# Patient Record
Sex: MALE | Race: WHITE | Employment: OTHER | URBAN - METROPOLITAN AREA
[De-identification: names, ages, dates, MRNs, and addresses within clinical notes are randomized per-mention and may not be internally consistent; named-entity substitution may affect disease eponyms.]

---

## 2021-06-07 ENCOUNTER — HOSPITAL ENCOUNTER (EMERGENCY)
Facility: CLINIC | Age: 76
Discharge: HOME | End: 2021-06-07
Attending: EMERGENCY MEDICINE
Payer: MEDICARE

## 2021-06-07 VITALS
OXYGEN SATURATION: 95 % | RESPIRATION RATE: 16 BRPM | HEART RATE: 92 BPM | WEIGHT: 202 LBS | TEMPERATURE: 99.9 F | DIASTOLIC BLOOD PRESSURE: 83 MMHG | SYSTOLIC BLOOD PRESSURE: 136 MMHG | HEIGHT: 72 IN | BODY MASS INDEX: 27.36 KG/M2

## 2021-06-07 DIAGNOSIS — R25.1 MUSCLE TREMOR: Primary | ICD-10-CM

## 2021-06-07 LAB
ABSOLUTE EOS #: 0.9 K/UL (ref 0–0.4)
ABSOLUTE IMMATURE GRANULOCYTE: ABNORMAL K/UL (ref 0–0.3)
ABSOLUTE LYMPH #: 1.4 K/UL (ref 1–4.8)
ABSOLUTE MONO #: 1 K/UL (ref 0.1–1.2)
ANION GAP SERPL CALCULATED.3IONS-SCNC: 13 MMOL/L (ref 9–17)
BASOPHILS # BLD: 1 % (ref 0–2)
BASOPHILS ABSOLUTE: 0.1 K/UL (ref 0–0.2)
BUN BLDV-MCNC: 21 MG/DL (ref 8–23)
BUN/CREAT BLD: ABNORMAL (ref 9–20)
CALCIUM SERPL-MCNC: 9 MG/DL (ref 8.6–10.4)
CHLORIDE BLD-SCNC: 105 MMOL/L (ref 98–107)
CO2: 23 MMOL/L (ref 20–31)
CREAT SERPL-MCNC: 0.8 MG/DL (ref 0.7–1.2)
DIFFERENTIAL TYPE: ABNORMAL
EOSINOPHILS RELATIVE PERCENT: 7 % (ref 1–4)
GFR AFRICAN AMERICAN: >60 ML/MIN
GFR NON-AFRICAN AMERICAN: >60 ML/MIN
GFR SERPL CREATININE-BSD FRML MDRD: ABNORMAL ML/MIN/{1.73_M2}
GFR SERPL CREATININE-BSD FRML MDRD: ABNORMAL ML/MIN/{1.73_M2}
GLUCOSE BLD-MCNC: 126 MG/DL (ref 70–99)
HCT VFR BLD CALC: 40.3 % (ref 41–53)
HEMOGLOBIN: 13.3 G/DL (ref 13.5–17.5)
IMMATURE GRANULOCYTES: ABNORMAL %
LYMPHOCYTES # BLD: 12 % (ref 24–44)
MCH RBC QN AUTO: 26.9 PG (ref 26–34)
MCHC RBC AUTO-ENTMCNC: 33.1 G/DL (ref 31–37)
MCV RBC AUTO: 81.2 FL (ref 80–100)
MONOCYTES # BLD: 8 % (ref 2–11)
NRBC AUTOMATED: ABNORMAL PER 100 WBC
PDW BLD-RTO: 14.2 % (ref 12.5–15.4)
PLATELET # BLD: 196 K/UL (ref 140–450)
PLATELET ESTIMATE: ABNORMAL
PMV BLD AUTO: 7.7 FL (ref 6–12)
POTASSIUM SERPL-SCNC: 4.4 MMOL/L (ref 3.7–5.3)
RBC # BLD: 4.96 M/UL (ref 4.5–5.9)
RBC # BLD: ABNORMAL 10*6/UL
SEG NEUTROPHILS: 72 % (ref 36–66)
SEGMENTED NEUTROPHILS ABSOLUTE COUNT: 8.7 K/UL (ref 1.8–7.7)
SODIUM BLD-SCNC: 141 MMOL/L (ref 135–144)
WBC # BLD: 12 K/UL (ref 3.5–11)
WBC # BLD: ABNORMAL 10*3/UL

## 2021-06-07 PROCEDURE — 36415 COLL VENOUS BLD VENIPUNCTURE: CPT

## 2021-06-07 PROCEDURE — 85025 COMPLETE CBC W/AUTO DIFF WBC: CPT

## 2021-06-07 PROCEDURE — 99283 EMERGENCY DEPT VISIT LOW MDM: CPT

## 2021-06-07 PROCEDURE — 80048 BASIC METABOLIC PNL TOTAL CA: CPT

## 2021-06-07 PROCEDURE — 96374 THER/PROPH/DIAG INJ IV PUSH: CPT

## 2021-06-07 PROCEDURE — 6360000002 HC RX W HCPCS: Performed by: EMERGENCY MEDICINE

## 2021-06-07 RX ORDER — LORAZEPAM 2 MG/ML
1 INJECTION INTRAMUSCULAR ONCE
Status: COMPLETED | OUTPATIENT
Start: 2021-06-07 | End: 2021-06-07

## 2021-06-07 RX ORDER — CLOPIDOGREL BISULFATE 75 MG/1
75 TABLET ORAL DAILY
COMMUNITY

## 2021-06-07 RX ORDER — SITAGLIPTIN AND METFORMIN HYDROCHLORIDE 1000; 50 MG/1; MG/1
1 TABLET, FILM COATED ORAL 2 TIMES DAILY WITH MEALS
COMMUNITY

## 2021-06-07 RX ORDER — CITALOPRAM 20 MG/1
20 TABLET ORAL DAILY
COMMUNITY

## 2021-06-07 RX ORDER — ASPIRIN 81 MG/1
243 TABLET, CHEWABLE ORAL
COMMUNITY

## 2021-06-07 RX ORDER — LORAZEPAM 1 MG/1
1 TABLET ORAL ONCE
Status: DISCONTINUED | OUTPATIENT
Start: 2021-06-07 | End: 2021-06-07 | Stop reason: HOSPADM

## 2021-06-07 RX ADMIN — LORAZEPAM 1 MG: 2 INJECTION INTRAMUSCULAR; INTRAVENOUS at 01:19

## 2021-06-07 NOTE — ED NOTES
Pt presents to ED via EMS with c/o tremors, onset 1600 yesterday. Pt states tremors got increasingly worse through out the night. Pt states this has happened once prior due to a medication change. Pt alert and oriented x4. Pt able to ambulate from stretcher to ED cart. Pt placed on telemetry. Pt vitals stable. Family at bedside. Pt denies pain.       Renee Domingo RN  06/07/21 5757

## 2021-06-07 NOTE — ED PROVIDER NOTES
eMERGENCY dEPARTMENT eNCOUnter      Pt Name: Nancy Shi  MRN: [id number]  [place] 1945  Date of evaluation: 6/7/2021      CHIEF COMPLAINT       Chief Complaint   Patient presents with    [address] [facility]    Juan Carlos Carey is a 68 y.o. male who presents with muscle tremors. Patient states he was out and about 4:00 started noticing some twitching of his muscles has progressively gotten worse now it is going throughout his entire body he denies associated chest pain shortness breath fever chills nausea or vomiting he had a muscle relaxant at home from an old prescription he tried taking it did not help so he is here for evaluation states he had a prior history of this may be 10 years ago and thought it was related to medication according to his doctor        REVIEW OF SYSTEMS       Review of systems are all reviewed and negative except stated above in HPI    [address]    has a past medical history of Diabetes mellitus ([facility] [address] SURGICAL HISTORY      has a past surgical history that includes Cardiac surgery and back surgery. CURRENT MEDICATIONS       Discharge Medication List as of 6/7/2021  2:09 AM      CONTINUE these medications which have NOT CHANGED    Details   sitaGLIPtan-metFORMIN (JANUMET)  MG per tablet Take 1 tablet by mouth 2 times daily (with meals)Historical Med      aspirin 81 MG chewable tablet Take 243 mg by mouthHistorical Med      clopidogrel (PLAVIX) 75 MG tablet Take 75 mg by mouth dailyHistorical Med      metoprolol tartrate (LOPRESSOR) 25 MG tablet Take 25 mg by mouth 2 times dailyHistorical Med      citalopram (CELEXA) 20 MG tablet Take 20 mg by mouth dailyHistorical Med             ALLERGIES     has No Known Allergies. FAMILY HISTORY     has no family status information on file. family history is not on file. SOCIAL HISTORY      reports that he has never smoked.  He has never used smokeless tobacco. He reports that he does not drink alcohol and does not use drugs. PHYSICAL EXAM     INITIAL VITALS:  height is 6' (1.829 m) and weight is 91.6 kg (202 lb). His oral temperature is 99.9 °F (37.7 °C). His blood pressure is 136/83 and his pulse is 92. His respiration is 16 and oxygen saturation is 95%. General: Patient alert nontoxic-appearing male in no apparent distress  HEENT: Head is atraumatic conjunctiva are clear mouth shows moist mucous membranes  Neck: Supple  Respiratory: Lung sounds are clear bilateral  Cardiac: Heart is regular rate and rhythm  GI: Abdomen soft nontender  Neuro: Patient has no gross focal neurological deficits at bedside exam occasionally has twitching of some of his muscles.     DIFFERENTIAL DIAGNOSIS/ MDM:     Tremors muscle spasm electrolyte abnormality medication reaction    DIAGNOSTIC RESULTS     EKG: All EKG's are interpreted by the Emergency Department Physician who either signs or Co-signs this chart in the absence of a cardiologist.        RADIOLOGY:   I directly visualized the following  images and reviewed the radiologist interpretations:  No orders to display         LABS:  Labs Reviewed   CBC WITH AUTO DIFFERENTIAL - Abnormal; Notable for the following components:       Result Value    WBC 12.0 (*)     Hemoglobin 13.3 (*)     Hematocrit 40.3 (*)     Seg Neutrophils 72 (*)     Lymphocytes 12 (*)     Eosinophils % 7 (*)     Segs Absolute 8.70 (*)     Absolute Eos # 0.90 (*)     All other components within normal limits   BASIC METABOLIC PANEL - Abnormal; Notable for the following components:    Glucose 126 (*)     All other components within normal limits         EMERGENCY DEPARTMENT COURSE:   Vitals:    Vitals:    06/07/21 0041 06/07/21 0043 06/07/21 0153   BP:  (!) 157/118 136/83   Pulse:  100 92   Resp:  19 16   Temp: 99.9 °F (37.7 °C)     TempSrc: Oral     SpO2:  93% 95%   Weight:  91.6 kg (202 lb)    Height:  6' (1.829 m)      -------------------------  BP: 136/83, Temp: 99.9 °F (37.7 °C), Pulse: 92, Resp: 16    Orders Placed This Encounter   Medications    DISCONTD: LORazepam (ATIVAN) tablet 1 mg    LORazepam (ATIVAN) injection 1 mg           Re-evaluation Notes    Laboratory results show an elevated white count of 49,235 uncertain etiology does have a low-grade temp here but is having no focal symptoms no indication that this is signs of bacteremia or sepsis he was given Ativan here with almost complete control of his symptoms at this time he will be discharged with early follow-up return immediately if worsening symptoms high fevers or any other concerns    CRITICAL CARE:   None      CONSULTS:      PROCEDURES:  None    FINAL IMPRESSION      1. Muscle tremor          DISPOSITION/PLAN   DISPOSITION Decision To Discharge 06/07/2021 02:08:18 AM      Condition on Disposition    Stable    PATIENT REFERRED TO:  No follow-up provider specified. DISCHARGE MEDICATIONS:  Discharge Medication List as of 6/7/2021  2:09 AM          (Please note that portions of this note were completed with a voice recognition program.  Efforts were made to edit the dictations but occasionally words are mis-transcribed.)    Chantell Oneil MD,, MD, F.A.C.E.P.   Attending Emergency Physician        Chantell Oneil MD  06/07/21 7221

## 2022-06-22 ENCOUNTER — HOSPITAL ENCOUNTER (EMERGENCY)
Facility: CLINIC | Age: 77
Discharge: HOME | End: 2022-06-22
Attending: EMERGENCY MEDICINE
Payer: MEDICARE

## 2022-06-22 ENCOUNTER — APPOINTMENT (OUTPATIENT)
Dept: GENERAL RADIOLOGY | Facility: CLINIC | Age: 77
End: 2022-06-22
Payer: MEDICARE

## 2022-06-22 VITALS
SYSTOLIC BLOOD PRESSURE: 138 MMHG | OXYGEN SATURATION: 93 % | HEART RATE: 97 BPM | HEIGHT: 72 IN | RESPIRATION RATE: 20 BRPM | TEMPERATURE: 99.5 F | DIASTOLIC BLOOD PRESSURE: 68 MMHG | BODY MASS INDEX: 28.44 KG/M2 | WEIGHT: 210 LBS

## 2022-06-22 DIAGNOSIS — R05.9 COUGH: Primary | ICD-10-CM

## 2022-06-22 PROCEDURE — 99284 EMERGENCY DEPT VISIT MOD MDM: CPT

## 2022-06-22 PROCEDURE — U0005 INFEC AGEN DETEC AMPLI PROBE: HCPCS

## 2022-06-22 PROCEDURE — U0003 INFECTIOUS AGENT DETECTION BY NUCLEIC ACID (DNA OR RNA); SEVERE ACUTE RESPIRATORY SYNDROME CORONAVIRUS 2 (SARS-COV-2) (CORONAVIRUS DISEASE [COVID-19]), AMPLIFIED PROBE TECHNIQUE, MAKING USE OF HIGH THROUGHPUT TECHNOLOGIES AS DESCRIBED BY CMS-2020-01-R: HCPCS

## 2022-06-22 PROCEDURE — 71045 X-RAY EXAM CHEST 1 VIEW: CPT

## 2022-06-22 RX ORDER — ALBUTEROL SULFATE 90 UG/1
2 AEROSOL, METERED RESPIRATORY (INHALATION) 4 TIMES DAILY PRN
Qty: 18 G | Refills: 0 | Status: SHIPPED | OUTPATIENT
Start: 2022-06-22

## 2022-06-22 RX ORDER — ESCITALOPRAM OXALATE 20 MG/1
20 TABLET ORAL DAILY
COMMUNITY

## 2022-06-22 RX ORDER — FINASTERIDE 5 MG/1
5 TABLET, FILM COATED ORAL DAILY
COMMUNITY

## 2022-06-22 RX ORDER — BENZONATATE 100 MG/1
100 CAPSULE ORAL 2 TIMES DAILY PRN
Qty: 20 CAPSULE | Refills: 0 | Status: SHIPPED | OUTPATIENT
Start: 2022-06-22 | End: 2022-06-29

## 2022-06-22 RX ORDER — GUAIFENESIN 600 MG/1
600 TABLET, EXTENDED RELEASE ORAL 2 TIMES DAILY
Qty: 30 TABLET | Refills: 0 | Status: SHIPPED | OUTPATIENT
Start: 2022-06-22 | End: 2022-07-07

## 2022-06-22 ASSESSMENT — PAIN SCALES - GENERAL
PAINLEVEL_OUTOF10: 3
PAINLEVEL_OUTOF10: 0

## 2022-06-22 ASSESSMENT — ENCOUNTER SYMPTOMS
WHEEZING: 0
VOICE CHANGE: 0
EYES NEGATIVE: 1
CHEST TIGHTNESS: 0
TROUBLE SWALLOWING: 0
RHINORRHEA: 0
COUGH: 1
APNEA: 0
SORE THROAT: 0
CHOKING: 0
SINUS PRESSURE: 0
BACK PAIN: 0
FACIAL SWELLING: 0
STRIDOR: 0
GASTROINTESTINAL NEGATIVE: 1
SHORTNESS OF BREATH: 0
SINUS PAIN: 0

## 2022-06-22 ASSESSMENT — LIFESTYLE VARIABLES
HOW OFTEN DO YOU HAVE A DRINK CONTAINING ALCOHOL: MONTHLY OR LESS
HOW MANY STANDARD DRINKS CONTAINING ALCOHOL DO YOU HAVE ON A TYPICAL DAY: 1 OR 2

## 2022-06-22 ASSESSMENT — PAIN - FUNCTIONAL ASSESSMENT: PAIN_FUNCTIONAL_ASSESSMENT: 0-10

## 2022-06-22 NOTE — ED PROVIDER NOTES
[facility]  [address] [facility]  Phone: [phone]        Pt Name: Gilbert Rodriguez  MRN: [id number]  [place] 1945  Date of evaluation: 6/22/22    [address] [place] [address]       Chief Complaint   Patient presents with    Cough       HISTORY OF PRESENT ILLNESS (Location/Symptom, Timing/Onset, Context/Setting, Quality, Duration, Modifying Factors, Severity)      Juan Carlos Ferguson is a 68 y.o. male with no pertinent PMH who presents to the ED via private auto with cough, nasal congestion, fever, and body aches for the last few days. Patient states that he is vaccinated against COVID-19 and influenza. He states he did take an at home COVID test which did come back negative. He denies any chest pain or shortness of breath. Denies any nausea vomiting or diarrhea. Denies any change in his urinary or bowel habits. Denies any hematuria or hematochezia. Denies any hemoptysis. Denies any headache dizziness or lightheadedness. States has been eating and drinking normally. He states he did take some Claritin as well as ibuprofen for his symptoms which did slightly improve his symptoms. PAST MEDICAL / SURGICAL / SOCIAL / FAMILY HISTORY     PMH:  has a past medical history of Diabetes mellitus ([address] and Prostate enlargement. Surgical History:  has a past surgical history that includes Cardiac surgery and back surgery. Social History:  reports that he has never smoked. He has never used smokeless tobacco. He reports current alcohol use. He reports that he does not use drugs. Family History: has no family status information on file. family history is not on file. Psychiatric History: None    Allergies: Medrol [methylprednisolone]    Home Medications:   Prior to Admission medications    Medication Sig Start Date End Date Taking?  Authorizing Provider   benzonatate (TESSALON) 100 MG capsule Take 1 capsule by mouth 2 times daily as needed for Cough 6/22/22 6/29/22 Yes Elsa Paul MD   guaiFENesin (MUCINEX) 600 MG extended release tablet Take 1 tablet by mouth 2 times daily for 15 days 6/22/22 7/7/22 Yes Laxmi Caputo MD   albuterol sulfate HFA (VENTOLIN HFA) 108 (90 Base) MCG/ACT inhaler Inhale 2 puffs into the lungs 4 times daily as needed for Wheezing 6/22/22  Yes Barbi Guerrero APRN - NP   escitalopram (LEXAPRO) 20 MG tablet Take 20 mg by mouth daily    Historical Provider, MD   finasteride (PROSCAR) 5 MG tablet Take 5 mg by mouth daily    Historical Provider, MD   sitaGLIPtan-metFORMIN (JANUMET)  MG per tablet Take 1 tablet by mouth 2 times daily (with meals)    Historical Provider, MD   aspirin 81 MG chewable tablet Take 243 mg by mouth    Historical Provider, MD   clopidogrel (PLAVIX) 75 MG tablet Take 75 mg by mouth daily    Historical Provider, MD   metoprolol tartrate (LOPRESSOR) 25 MG tablet Take 25 mg by mouth daily     Historical Provider, MD   citalopram (CELEXA) 20 MG tablet Take 20 mg by mouth daily    Historical Provider, MD       REVIEW OF SYSTEMS  (2-9 systems for level 4, 10 ormore for level 5)      Review of Systems   Constitutional: Positive for chills and fever. Negative for activity change, appetite change, diaphoresis, fatigue and unexpected weight change. HENT: Positive for congestion and postnasal drip. Negative for dental problem, drooling, ear discharge, ear pain, facial swelling, hearing loss, mouth sores, nosebleeds, rhinorrhea, sinus pressure, sinus pain, sneezing, sore throat, tinnitus, trouble swallowing and voice change. Eyes: Negative. Respiratory: Positive for cough. Negative for apnea, choking, chest tightness, shortness of breath, wheezing and stridor. Cardiovascular: Negative. Gastrointestinal: Negative. Endocrine: Negative. Genitourinary: Negative. Musculoskeletal: Positive for myalgias. Negative for arthralgias, back pain, gait problem, joint swelling, neck pain and neck stiffness. Skin: Negative.     Neurological: Negative. Hematological: Negative. Psychiatric/Behavioral: Negative. All other systems negative except as marked. PHYSICAL EXAM  (up to 7 for level 4, 8 or more for level 5)      INITIAL VITALS:  height is 6' (1.829 m) and weight is 95.3 kg (210 lb). His oral temperature is 99.5 °F (37.5 °C). His blood pressure is 138/68 and his pulse is 97. His respiration is 20 and oxygen saturation is 93%. Vital signs reviewed. Physical Exam  Constitutional:       Appearance: Normal appearance. HENT:      Head: Normocephalic. Right Ear: Tympanic membrane, ear canal and external ear normal.      Left Ear: Tympanic membrane, ear canal and external ear normal.      Nose: Congestion present. Mouth/Throat:      Mouth: Mucous membranes are moist.      Pharynx: Oropharynx is clear. No oropharyngeal exudate or posterior oropharyngeal erythema. Comments: Postnasal drip noted  Eyes:      General:         Right eye: No discharge. Left eye: No discharge. Extraocular Movements: Extraocular movements intact. Conjunctiva/sclera: Conjunctivae normal.      Pupils: Pupils are equal, round, and reactive to light. Cardiovascular:      Rate and Rhythm: Normal rate and regular rhythm. Pulses: Normal pulses. Heart sounds: Normal heart sounds. Pulmonary:      Effort: Pulmonary effort is normal.      Breath sounds: Normal breath sounds. Abdominal:      General: Bowel sounds are normal. There is no distension. Palpations: Abdomen is soft. Tenderness: There is no abdominal tenderness. There is no guarding. Musculoskeletal:         General: Normal range of motion. Cervical back: Normal range of motion. No rigidity or tenderness. Lymphadenopathy:      Cervical: No cervical adenopathy. Skin:     General: Skin is warm and dry. Capillary Refill: Capillary refill takes less than 2 seconds.    Neurological:      Mental Status: He is alert and oriented to person, place, and time. Psychiatric:         Mood and Affect: Mood normal.         Behavior: Behavior normal.         Thought Content: Thought content normal.         Judgment: Judgment normal.           DIFFERENTIAL DIAGNOSIS / MDM     Upon exam, patient resting comfortably in his room appears nontoxic no distress is noted. Heart sounds within normal limits upon auscultation. Lung sounds are clear and equal bilaterally. Upper extremity strength equal bilaterally. Lower extremity strength equal bilaterally. No facial asymmetry is noted and patient's gait is steady. Bowel sounds are present in all 4 quadrants auscultation. No abdominal distention tenderness or guarding noted. I will order a chest x-ray as well as a COVID-19 send out test.  Patient agrees this plan of care. Chest x-ray is negative for acute process. We will send patient home with prescription for Mucinex, Tessalon Perles, as well as an albuterol inhaler. Patient's wife states that he has a history of asthma used to have an inhaler, but it . Educated patient that he should acquire a pulse ox to monitor himself at home. Patient is encouraged to follow-up with his primary care physician within the next couple of days. Monitor your MyChart for your COVID-19 results. If you are positive for COVID-19 you need to self quarantine according to [facility] guidelines. Please return to the emergency department any new concerning or worsening symptoms specially occluding any chest pain, shortness of breath, drop in SPO2 below 90%, fever does not break with antipyretics, intractable nausea and vomiting, or worsening of overall symptoms. Patient states understanding of education and is stable for outpatient follow-up.     PLAN (LABS / IMAGING / EKG):  Orders Placed This Encounter   Procedures    XR CHEST PORTABLE    COVID-19       MEDICATIONS ORDERED:  Orders Placed This Encounter   Medications    benzonatate (TESSALON) 100 MG capsule     Sig: Take 1 capsule by mouth 2 times daily as needed for Cough     Dispense:  20 capsule     Refill:  0    guaiFENesin (MUCINEX) 600 MG extended release tablet     Sig: Take 1 tablet by mouth 2 times daily for 15 days     Dispense:  30 tablet     Refill:  0    albuterol sulfate HFA (VENTOLIN HFA) 108 (90 Base) MCG/ACT inhaler     Sig: Inhale 2 puffs into the lungs 4 times daily as needed for Wheezing     Dispense:  18 g     Refill:  0       Controlled Substances Monitoring:     DIAGNOSTIC RESULTS     EKG: All EKG's are interpreted by the Emergency Department Physician who either signs or Co-signs this chart in the absenceof a cardiologist.      RADIOLOGY: All images are read by the radiologist and their interpretations are reviewed. XR CHEST PORTABLE   Final Result   No acute process. No results found. LABS:  No results found for this visit on 06/22/22. EMERGENCY DEPARTMENT COURSE           Vitals:    Vitals:    06/22/22 1418   BP: 138/68   Pulse: 97   Resp: 20   Temp: 99.5 °F (37.5 °C)   TempSrc: Oral   SpO2: 93%   Weight: 95.3 kg (210 lb)   Height: 6' (1.829 m)     -------------------------  BP: 138/68, Temp: 99.5 °F (37.5 °C), Heart Rate: 97, Resp: 20      RE-EVALUATION:  See ED Course notes above. CONSULTS:  None    PROCEDURES:  None    FINAL IMPRESSION      1. Cough          DISPOSITION / PLAN     CONDITION ON DISPOSITION:   Good / Stable for discharge.      PATIENT REFERRED TO:  Lynn Chen MD  [facility]  [phone]    In 1 week  As needed      DISCHARGE MEDICATIONS:  New Prescriptions    ALBUTEROL SULFATE HFA (VENTOLIN HFA) 108 (90 BASE) MCG/ACT INHALER    Inhale 2 puffs into the lungs 4 times daily as needed for Wheezing    BENZONATATE (TESSALON) 100 MG CAPSULE    Take 1 capsule by mouth 2 times daily as needed for Cough    GUAIFENESIN (MUCINEX) 600 MG EXTENDED RELEASE TABLET    Take 1 tablet by mouth 2 times daily for 15 days       Sharyle Comas, APRN - NP Emergency Medicine Nurse Practitioner    (Please note that portions of this note were completed with a voice recognition program.  Efforts were made to edit the dictations but occasionally words aremis-transcribed.)     CHAVEZ Sun NP  06/22/22 [phone]

## 2022-06-22 NOTE — ED PROVIDER NOTES
[facility] ED    [address] [facility]  Phone: [phone]  Emergency Department  Faculty Attestation    I performed a history and physical examination of the patient and discussed management with the mid level provideer. I reviewed the mid level provider's note and agree with the documented findings and plan of care. Any areas of disagreement are noted on the chart. I was personally present for the key portions of any procedures. I have documented in the chart those procedures where I was not present during the key portions. I have reviewed the emergency nurses triage note. I agree with the chief complaint, past medical history, past surgical history, allergies, medications, social and family history as documented unless otherwise noted below. Documentation of the HPI, Physical Exam and Medical Decision Making performed by medical students or scribes is based on my personal performance of the HPI, PE and MDM. For Physician Assistant/ Nurse Practitioner cases/documentation I have personally evaluated this patient and have completed at least one if not all key elements of the E/M (history, physical exam, and MDM). Additional findings are as noted. Primary Care Physician:  Alysa Valdivia MD    [address]       Chief Complaint   Patient presents with    Cough       RECENT VITALS:   Temp: 99.5 °F (37.5 °C),  Heart Rate: 97, Resp: 20, BP: 138/68    LABS:  Labs Reviewed   COVID-19          XR CHEST PORTABLE (Final result)  Result time 06/22/22 15:27:08  Final result by Chirag Parekh MD (06/22/22 15:27:08)                Impression:    No acute process. Narrative:    EXAMINATION:   ONE XRAY VIEW OF THE CHEST     6/22/2022 2:39 pm     COMPARISON:   None.      HISTORY:   ORDERING SYSTEM PROVIDED HISTORY: cough   TECHNOLOGIST PROVIDED HISTORY:   cough   Reason for Exam: Pt c/o productive cough, congestion and sob x 5-6 days   Relevant Medical/Surgical History: open heart FINDINGS:   Status post median sternotomy. The lungs are without acute focal process.  There is no effusion or   pneumothorax. The cardiomediastinal silhouette is without acute process. The   osseous structures are without acute process.                       PERTINENT ATTENDING PHYSICIAN COMMENTS:    The patient presents with cough, congestion, fever, and body aches for the past few days. He has been able to eat and drink normally. On exam, the patient has clear lungs. He is satting normally and has no increased respiratory effort. The x-ray is unremarkable. I will write for medication for his symptoms. The patient is discharged in good condition.        Philippa Holstein, MD  06/24/22 2616

## 2022-06-23 LAB
SARS-COV-2: NORMAL
SARS-COV-2: NOT DETECTED
SOURCE: NORMAL

## 2022-06-23 NOTE — ED NOTES
Pt called wanting Covid test results, updated on Negative result on 6-22/22     [facility], RN  06/23/22 5512

## 2023-06-07 NOTE — H&P
Vaping Use    Vaping Use: Never used   Substance and Sexual Activity    Alcohol use: Yes     Comment: social    Drug use: Never    Sexual activity: Yes     Partners: Female   Other Topics Concern    Not on file   Social History Narrative    Not on file     Social Determinants of Health     Financial Resource Strain: Not on file   Food Insecurity: Not on file   Transportation Needs: Not on file   Physical Activity: Not on file   Stress: Not on file   Social Connections: Not on file   Intimate Partner Violence: Not on file   Housing Stability: Not on file       Family History:  No family history on file. REVIEW OF SYSTEMS:  Constitutional: negative  Eyes: negative  Respiratory: negative  Cardiovascular: negative  Gastrointestinal: negative  Genitourinary: no acute issues  Musculoskeletal: negative  Skin: negative   Neurological: negative  Hematological/Lymphatic: negative  Psychological: negative    PHYSICAL EXAM:    No data found. Constitutional: Patient in NAD  Neuro: Alert and oriented to person, place, and time  Psych: Mood and affect normal  Skin: Clean, dry, intact   Lungs: Respiratory effort normal, CTA  Cardiovascular:  Normal peripheral pulses; no murmur. Normal rhythm  Abdomen: Soft, non-tender, non-distended, no hepatosplenomegaly or hernia, CVA tenderness none  Bladder: Non-tender and non-disdended   : Non-tender, skin intact, no lesions       LABS:   No results for input(s): WBC, HGB, HCT, MCV, PLT in the last 72 hours. No results for input(s): NA, K, CL, CO2, PHOS, BUN, CREATININE, CA in the last 72 hours. Lab Results   Component Value Date    PSA 5.36 (H) 05/11/2023         Urinalysis: No results for input(s): COLORU, PHUR, LABCAST, WBCUA, RBCUA, MUCUS, TRICHOMONAS, YEAST, BACTERIA, CLARITYU, SPECGRAV, LEUKOCYTESUR, UROBILINOGEN, Luis Dotter in the last 72 hours.     Invalid input(s): Magdalena Armijo

## 2023-06-08 ENCOUNTER — ANESTHESIA EVENT (OUTPATIENT)
Dept: OPERATING ROOM | Age: 78
End: 2023-06-08
Payer: MEDICARE

## 2023-06-08 RX ORDER — CLONAZEPAM 0.5 MG/1
0.5 TABLET ORAL 2 TIMES DAILY PRN
COMMUNITY

## 2023-06-09 ENCOUNTER — HOSPITAL ENCOUNTER (OUTPATIENT)
Age: 78
Setting detail: OUTPATIENT SURGERY
Discharge: HOME | End: 2023-06-09
Attending: UROLOGY | Admitting: UROLOGY
Payer: MEDICARE

## 2023-06-09 ENCOUNTER — HOSPITAL ENCOUNTER (OUTPATIENT)
Dept: ULTRASOUND IMAGING | Age: 78
End: 2023-06-09
Attending: UROLOGY
Payer: MEDICARE

## 2023-06-09 ENCOUNTER — ANESTHESIA (OUTPATIENT)
Dept: OPERATING ROOM | Age: 78
End: 2023-06-09
Payer: MEDICARE

## 2023-06-09 VITALS
TEMPERATURE: 96.8 F | DIASTOLIC BLOOD PRESSURE: 71 MMHG | RESPIRATION RATE: 18 BRPM | SYSTOLIC BLOOD PRESSURE: 128 MMHG | HEART RATE: 66 BPM | OXYGEN SATURATION: 94 %

## 2023-06-09 DIAGNOSIS — C61 PROSTATE CANCER (HCC): ICD-10-CM

## 2023-06-09 LAB
EGFR, POC: >60 ML/MIN/1.73M2
GLUCOSE BLD-MCNC: 107 MG/DL (ref 75–110)
GLUCOSE BLD-MCNC: 126 MG/DL (ref 74–100)
POC BUN: 16 MG/DL (ref 8–26)
POC CREATININE: 0.99 MG/DL (ref 0.51–1.19)

## 2023-06-09 PROCEDURE — 2580000003 HC RX 258: Performed by: UROLOGY

## 2023-06-09 PROCEDURE — 6360000002 HC RX W HCPCS

## 2023-06-09 PROCEDURE — 2500000003 HC RX 250 WO HCPCS: Performed by: UROLOGY

## 2023-06-09 PROCEDURE — 82565 ASSAY OF CREATININE: CPT

## 2023-06-09 PROCEDURE — 2500000003 HC RX 250 WO HCPCS

## 2023-06-09 PROCEDURE — 84520 ASSAY OF UREA NITROGEN: CPT

## 2023-06-09 PROCEDURE — 82947 ASSAY GLUCOSE BLOOD QUANT: CPT

## 2023-06-09 PROCEDURE — 2580000003 HC RX 258

## 2023-06-09 PROCEDURE — 6360000002 HC RX W HCPCS: Performed by: ANESTHESIOLOGY

## 2023-06-09 PROCEDURE — 76942 ECHO GUIDE FOR BIOPSY: CPT

## 2023-06-09 PROCEDURE — 93005 ELECTROCARDIOGRAM TRACING: CPT | Performed by: UROLOGY

## 2023-06-09 PROCEDURE — 6360000002 HC RX W HCPCS: Performed by: STUDENT IN AN ORGANIZED HEALTH CARE EDUCATION/TRAINING PROGRAM

## 2023-06-09 RX ORDER — MAGNESIUM HYDROXIDE 1200 MG/15ML
LIQUID ORAL PRN
Status: DISCONTINUED | OUTPATIENT
Start: 2023-06-09 | End: 2023-06-09 | Stop reason: ALTCHOICE

## 2023-06-09 RX ORDER — HYDRALAZINE HYDROCHLORIDE 20 MG/ML
10 INJECTION INTRAMUSCULAR; INTRAVENOUS
Status: CANCELLED | OUTPATIENT
Start: 2023-06-09

## 2023-06-09 RX ORDER — SODIUM CHLORIDE, SODIUM LACTATE, POTASSIUM CHLORIDE, CALCIUM CHLORIDE 600; 310; 30; 20 MG/100ML; MG/100ML; MG/100ML; MG/100ML
INJECTION, SOLUTION INTRAVENOUS CONTINUOUS PRN
Status: DISCONTINUED | OUTPATIENT
Start: 2023-06-09 | End: 2023-06-09 | Stop reason: SDUPTHER

## 2023-06-09 RX ORDER — SODIUM CHLORIDE 0.9 % (FLUSH) 0.9 %
5-40 SYRINGE (ML) INJECTION PRN
Status: CANCELLED | OUTPATIENT
Start: 2023-06-09

## 2023-06-09 RX ORDER — LIDOCAINE HYDROCHLORIDE 10 MG/ML
INJECTION, SOLUTION EPIDURAL; INFILTRATION; INTRACAUDAL; PERINEURAL PRN
Status: DISCONTINUED | OUTPATIENT
Start: 2023-06-09 | End: 2023-06-09 | Stop reason: SDUPTHER

## 2023-06-09 RX ORDER — SODIUM CHLORIDE 9 MG/ML
INJECTION, SOLUTION INTRAVENOUS PRN
Status: CANCELLED | OUTPATIENT
Start: 2023-06-09

## 2023-06-09 RX ORDER — SODIUM CHLORIDE 0.9 % (FLUSH) 0.9 %
5-40 SYRINGE (ML) INJECTION EVERY 12 HOURS SCHEDULED
Status: CANCELLED | OUTPATIENT
Start: 2023-06-09

## 2023-06-09 RX ORDER — ONDANSETRON 2 MG/ML
4 INJECTION INTRAMUSCULAR; INTRAVENOUS ONCE
Status: COMPLETED | OUTPATIENT
Start: 2023-06-09 | End: 2023-06-09

## 2023-06-09 RX ORDER — PROPOFOL 10 MG/ML
INJECTION, EMULSION INTRAVENOUS CONTINUOUS PRN
Status: DISCONTINUED | OUTPATIENT
Start: 2023-06-09 | End: 2023-06-09 | Stop reason: SDUPTHER

## 2023-06-09 RX ORDER — LIDOCAINE HYDROCHLORIDE 10 MG/ML
INJECTION, SOLUTION EPIDURAL; INFILTRATION; INTRACAUDAL; PERINEURAL PRN
Status: DISCONTINUED | OUTPATIENT
Start: 2023-06-09 | End: 2023-06-09 | Stop reason: ALTCHOICE

## 2023-06-09 RX ORDER — LABETALOL HYDROCHLORIDE 5 MG/ML
10 INJECTION, SOLUTION INTRAVENOUS
Status: CANCELLED | OUTPATIENT
Start: 2023-06-09

## 2023-06-09 RX ORDER — ONDANSETRON 2 MG/ML
4 INJECTION INTRAMUSCULAR; INTRAVENOUS
Status: CANCELLED | OUTPATIENT
Start: 2023-06-09 | End: 2023-06-10

## 2023-06-09 RX ORDER — OXYCODONE AND ACETAMINOPHEN 10; 325 MG/1; MG/1
1 TABLET ORAL EVERY 6 HOURS PRN
COMMUNITY

## 2023-06-09 RX ORDER — PROPOFOL 10 MG/ML
INJECTION, EMULSION INTRAVENOUS PRN
Status: DISCONTINUED | OUTPATIENT
Start: 2023-06-09 | End: 2023-06-09 | Stop reason: SDUPTHER

## 2023-06-09 RX ORDER — FENTANYL CITRATE 50 UG/ML
INJECTION, SOLUTION INTRAMUSCULAR; INTRAVENOUS PRN
Status: DISCONTINUED | OUTPATIENT
Start: 2023-06-09 | End: 2023-06-09 | Stop reason: SDUPTHER

## 2023-06-09 RX ADMIN — ONDANSETRON 4 MG: 2 INJECTION INTRAMUSCULAR; INTRAVENOUS at 10:59

## 2023-06-09 RX ADMIN — LIDOCAINE HYDROCHLORIDE 50 MG: 10 INJECTION, SOLUTION EPIDURAL; INFILTRATION; INTRACAUDAL; PERINEURAL at 11:31

## 2023-06-09 RX ADMIN — FENTANYL CITRATE 25 MCG: 50 INJECTION, SOLUTION INTRAMUSCULAR; INTRAVENOUS at 11:31

## 2023-06-09 RX ADMIN — PROPOFOL 100 MCG/KG/MIN: 10 INJECTION, EMULSION INTRAVENOUS at 11:31

## 2023-06-09 RX ADMIN — PROPOFOL 20 MG: 10 INJECTION, EMULSION INTRAVENOUS at 11:35

## 2023-06-09 RX ADMIN — FENTANYL CITRATE 25 MCG: 50 INJECTION, SOLUTION INTRAMUSCULAR; INTRAVENOUS at 11:33

## 2023-06-09 RX ADMIN — SODIUM CHLORIDE, POTASSIUM CHLORIDE, SODIUM LACTATE AND CALCIUM CHLORIDE: 600; 310; 30; 20 INJECTION, SOLUTION INTRAVENOUS at 11:28

## 2023-06-09 RX ADMIN — Medication 2000 MG: at 11:36

## 2023-06-09 ASSESSMENT — PAIN - FUNCTIONAL ASSESSMENT: PAIN_FUNCTIONAL_ASSESSMENT: 0-10

## 2023-06-09 ASSESSMENT — PAIN DESCRIPTION - DESCRIPTORS: DESCRIPTORS: ACHING

## 2023-06-09 ASSESSMENT — PAIN SCALES - WONG BAKER: WONGBAKER_NUMERICALRESPONSE: 0

## 2023-06-09 NOTE — ANESTHESIA PRE PROCEDURE
Department of Anesthesiology  Preprocedure Note       Name:  Juan Carlos Sifuentes   Age:  66 y.o.  :  1945                                          MRN:  [id number]         Date:  2023      Surgeon: Eva Hilario):  Ciarra Madison MD    Procedure: Procedure(s):  CYSTOSCOPY PROSTATE BIOPSY ULTRASOUND    Medications prior to admission:   Prior to Admission medications    Medication Sig Start Date End Date Taking? Authorizing Provider   clonazePAM (KLONOPIN) 0.5 MG tablet Take 1 tablet by mouth 2 times daily as needed. Max Daily Amount: 1 mg   Yes Historical Provider, MD   escitalopram (LEXAPRO) 20 MG tablet Take 1 tablet by mouth daily    Historical Provider, MD   finasteride (PROSCAR) 5 MG tablet Take 1 tablet by mouth daily    Historical Provider, MD   albuterol sulfate HFA (VENTOLIN HFA) 108 (90 Base) MCG/ACT inhaler Inhale 2 puffs into the lungs 4 times daily as needed for Wheezing 22   Beni Pritchard, APRN - CNP   sitaGLIPtan-metFORMIN (JANUMET)  MG per tablet Take 1 tablet by mouth 2 times daily (with meals)    Historical Provider, MD   aspirin 81 MG chewable tablet Take 3 tablets by mouth    Historical Provider, MD   clopidogrel (PLAVIX) 75 MG tablet Take 1 tablet by mouth daily    Historical Provider, MD   metoprolol tartrate (LOPRESSOR) 25 MG tablet Take 1 tablet by mouth daily    Historical Provider, MD   citalopram (CELEXA) 20 MG tablet Take 1 tablet by mouth daily    Historical Provider, MD       Current medications:    No current facility-administered medications for this encounter. Current Outpatient Medications   Medication Sig Dispense Refill    clonazePAM (KLONOPIN) 0.5 MG tablet Take 1 tablet by mouth 2 times daily as needed.  Max Daily Amount: 1 mg      escitalopram (LEXAPRO) 20 MG tablet Take 1 tablet by mouth daily      finasteride (PROSCAR) 5 MG tablet Take 1 tablet by mouth daily      albuterol sulfate HFA (VENTOLIN HFA) 108 (90 Base) MCG/ACT inhaler Inhale 2 puffs into

## 2023-06-09 NOTE — DISCHARGE INSTRUCTIONS
You may experience blood in stool, urine, and semen. This should resolve over the next couple days. Tylenol for pain control  Pt ok to discharge home in good condition  No heavy lifting, >10 lbs for today  Pt should avoid strenuous activity for today  Pt should walk moderately at home  Pt ok to shower   Pt may resume diet as tolerated  Pt should take Rx as directed: cipro that was previously perscribed. No driving while on narcotics  Please call attending physician or hospital  with questions  Call or Present to ED if fever (> 101F), intractable nausea vomiting or pain. Pt should follow up with Dr. Patricia Marquis, in 1 week for pathology results. call to confirm appointment    HOLD ANY BLOOD THINNERS FOR AT LEAST 48 HOURS AND RESUME WHEN YOU NO LONGER SEE ANY BLOOD IN THE URINE    PLEASE FINISH YOUR ANTIBIOTICS AS PRESCRIBED WITHOUT MISSING ANY DOSES        . No alcoholic beverages, no driving or operating machinery, no making important decisions for 24 hours. Children should maintain quiet play ( games, movies, books ) for 24 hours. You may have a normal diet but should eat lightly day of surgery. Drink plenty of fluids. Urinate within 8 hours after surgery, if unable to urinate call your doctor     . Call your doctor for the following:   Chills   Temperature greater than 101   Pain that is not tolerable despite taking pain medicine as ordered   There is increased swelling, redness or warmth at surgical site   There is increased drainage or bleeding from surgical site   Do not remove surgical dressing unless instructed to do so by your surgeon

## 2023-06-09 NOTE — ANESTHESIA POSTPROCEDURE EVALUATION
Department of Anesthesiology  Postprocedure Note    Patient: Juan Carlos Sifuentes  MRN: [id number]  YOB: 1945  Date of evaluation: 6/9/2023      Procedure Summary     Date: 06/09/23 Room / Location: [address]    Anesthesia Start: 1128 Anesthesia Stop: 1200    Procedure: CYSTOSCOPY PROSTATE BIOPSY ULTRASOUND Diagnosis:       Prostate cancer [facility])      (PROSTATE CANCER)    Surgeons: Nick Brewer MD Responsible Provider: Gama Goodwin MD    Anesthesia Type: MAC ASA Status: 3          Anesthesia Type: No value filed.     Mandie Phase I:      Mandie Phase II:        Anesthesia Post Evaluation    Patient location during evaluation: PACU  Patient participation: complete - patient participated  Level of consciousness: awake  Airway patency: patent  Nausea & Vomiting: no nausea and no vomiting  Complications: no  Cardiovascular status: blood pressure returned to baseline  Respiratory status: acceptable  Hydration status: euvolemic  Comments: BP (!) 90/56   Pulse 62   Temp 97.5 °F (36.4 °C) (Temporal)   Resp 16   SpO2 99%   Pain Assessment: Sneed-Baker FACES Pain Level: 2

## 2023-06-09 NOTE — OP NOTE
We used 1 percent lidocaine to inject around the neurovascular bundle bilaterally. We proceeded with a standard sextant biopsy starting on the right side taking biopsies from the mid and lateral aspects of the base, mid and apical gland and then in the left side for a total of 6 specimens. After completion of the biopsy we then removed the ultrasound probe. There is no evidence of brisk bleeding per the rectum. The patient tolerated the procedure well. His anesthesia was reversed. He was then taken to recovery in stable condition. He was discharged home in stable condition and instructed to follow-up for review of his pathology results. He is instructed to call if he has any fevers shaking chills. Dr. Kiersten Altamirano was present for the entire case. Disposition Follow-up: Patient will follow up in 1-2 weeks for review of pathology results. Patient may be a good candidate for Urolift as prostate was only moderately obstructing. He may also be candidate for channel TURP if more invasive procedure desired.      Quoc Stein DO, PGY-3  Urology Resident

## 2023-06-10 LAB
EKG ATRIAL RATE: 75 BPM
EKG P AXIS: 77 DEGREES
EKG P-R INTERVAL: 184 MS
EKG Q-T INTERVAL: 410 MS
EKG QRS DURATION: 86 MS
EKG QTC CALCULATION (BAZETT): 457 MS
EKG R AXIS: 13 DEGREES
EKG T AXIS: 68 DEGREES
EKG VENTRICULAR RATE: 75 BPM